# Patient Record
Sex: FEMALE | URBAN - METROPOLITAN AREA
[De-identification: names, ages, dates, MRNs, and addresses within clinical notes are randomized per-mention and may not be internally consistent; named-entity substitution may affect disease eponyms.]

---

## 2020-06-22 ENCOUNTER — RECORDS - HEALTHEAST (OUTPATIENT)
Dept: LAB | Facility: CLINIC | Age: 30
End: 2020-06-22

## 2020-06-22 LAB — RHEUMATOID FACT SERPL-ACNC: <15 IU/ML (ref 0–30)

## 2020-06-23 LAB — ANA SER QL: >12.8 U

## 2020-06-30 LAB
DNA (DS) ANTIBODY - HISTORICAL: 2 IU
JO-1 AUTOANTIBODIES - HISTORICAL: 0 EU
SCL-70 AUTOANTIBODIES - HISTORICAL: 1 EU
SM (SMITH AUTOANTIBODIES - HISTORICAL: 4 EU
SM/RNP AUTOANTIBODIES - HISTORICAL: 241 EU
SS-A/RO AUTOANTIBODIES - HISTORICAL: 1 EU
SS-B/LA AUTOANTIBODIES - HISTORICAL: 0 EU

## 2024-08-19 ENCOUNTER — HOSPITAL ENCOUNTER (INPATIENT)
Facility: HOSPITAL | Age: 34
Setting detail: SURGERY ADMIT
End: 2024-08-19
Attending: OBSTETRICS & GYNECOLOGY | Admitting: OBSTETRICS & GYNECOLOGY
Payer: MEDICAID

## 2024-11-20 RX ORDER — QUETIAPINE FUMARATE 100 MG/1
100 TABLET, FILM COATED ORAL AT BEDTIME
COMMUNITY
Start: 2024-06-20

## 2024-11-20 RX ORDER — DEXAMETHASONE 4 MG/1
4 TABLET ORAL 2 TIMES DAILY
COMMUNITY
Start: 2024-10-10

## 2024-11-20 RX ORDER — ESCITALOPRAM OXALATE 10 MG/1
10 TABLET ORAL DAILY
COMMUNITY

## 2024-11-20 RX ORDER — FERROUS SULFATE 325(65) MG
1 TABLET ORAL 2 TIMES DAILY
COMMUNITY
Start: 2024-06-20

## 2024-11-20 RX ORDER — TRAZODONE HYDROCHLORIDE 100 MG/1
100 TABLET ORAL AT BEDTIME
COMMUNITY
Start: 2024-06-20

## 2024-11-20 RX ORDER — OLANZAPINE 5 MG/1
5 TABLET ORAL DAILY
COMMUNITY
Start: 2024-10-10

## 2024-11-22 ENCOUNTER — HOSPITAL ENCOUNTER (OUTPATIENT)
Facility: HOSPITAL | Age: 34
Discharge: HOME | End: 2024-11-22
Attending: OBSTETRICS & GYNECOLOGY | Admitting: OBSTETRICS & GYNECOLOGY
Payer: MEDICAID

## 2024-11-22 VITALS
TEMPERATURE: 97.6 F | DIASTOLIC BLOOD PRESSURE: 74 MMHG | OXYGEN SATURATION: 98 % | WEIGHT: 137.7 LBS | HEART RATE: 80 BPM | RESPIRATION RATE: 14 BRPM | SYSTOLIC BLOOD PRESSURE: 115 MMHG

## 2024-11-22 LAB — HCG UR QL: NEGATIVE

## 2024-11-22 PROCEDURE — 360N000075 HC SURGERY LEVEL 2, PER MIN: Performed by: OBSTETRICS & GYNECOLOGY

## 2024-11-22 PROCEDURE — 81025 URINE PREGNANCY TEST: CPT | Performed by: PHYSICIAN ASSISTANT

## 2024-11-22 PROCEDURE — 272N000001 HC OR GENERAL SUPPLY STERILE: Performed by: OBSTETRICS & GYNECOLOGY

## 2024-11-22 PROCEDURE — 250N000009 HC RX 250: Performed by: OBSTETRICS & GYNECOLOGY

## 2024-11-22 PROCEDURE — 999N000141 HC STATISTIC PRE-PROCEDURE NURSING ASSESSMENT: Performed by: OBSTETRICS & GYNECOLOGY

## 2024-11-22 PROCEDURE — 710N000012 HC RECOVERY PHASE 2, PER MINUTE: Performed by: OBSTETRICS & GYNECOLOGY

## 2024-11-22 PROCEDURE — 370N000017 HC ANESTHESIA TECHNICAL FEE, PER MIN: Performed by: OBSTETRICS & GYNECOLOGY

## 2024-11-22 DEVICE — SLEEVE CT/MR INTRAUTERINE TUBE <=Ø 6MM
Type: IMPLANTABLE DEVICE | Site: CERVIX | Status: FUNCTIONAL
Brand: SMIT SLEEVE

## 2024-11-22 RX ORDER — ONDANSETRON 2 MG/ML
4 INJECTION INTRAMUSCULAR; INTRAVENOUS EVERY 30 MIN PRN
Status: DISCONTINUED | OUTPATIENT
Start: 2024-11-22 | End: 2024-11-22 | Stop reason: HOSPADM

## 2024-11-22 RX ORDER — OXYCODONE HYDROCHLORIDE 5 MG/1
10 TABLET ORAL
Status: DISCONTINUED | OUTPATIENT
Start: 2024-11-22 | End: 2024-11-22 | Stop reason: HOSPADM

## 2024-11-22 RX ORDER — ONDANSETRON 4 MG/1
4 TABLET, ORALLY DISINTEGRATING ORAL EVERY 30 MIN PRN
Status: DISCONTINUED | OUTPATIENT
Start: 2024-11-22 | End: 2024-11-22 | Stop reason: HOSPADM

## 2024-11-22 RX ORDER — LIDOCAINE 40 MG/G
CREAM TOPICAL
Status: DISCONTINUED | OUTPATIENT
Start: 2024-11-22 | End: 2024-11-22 | Stop reason: HOSPADM

## 2024-11-22 RX ORDER — OXYCODONE HYDROCHLORIDE 5 MG/1
5 TABLET ORAL
Status: DISCONTINUED | OUTPATIENT
Start: 2024-11-22 | End: 2024-11-22 | Stop reason: HOSPADM

## 2024-11-22 RX ORDER — DEXAMETHASONE SODIUM PHOSPHATE 10 MG/ML
4 INJECTION, SOLUTION INTRAMUSCULAR; INTRAVENOUS
Status: DISCONTINUED | OUTPATIENT
Start: 2024-11-22 | End: 2024-11-22 | Stop reason: HOSPADM

## 2024-11-22 RX ORDER — IBUPROFEN 200 MG
800 TABLET ORAL ONCE
Status: DISCONTINUED | OUTPATIENT
Start: 2024-11-23 | End: 2024-11-22 | Stop reason: HOSPADM

## 2024-11-22 RX ORDER — SODIUM CHLORIDE, SODIUM LACTATE, POTASSIUM CHLORIDE, CALCIUM CHLORIDE 600; 310; 30; 20 MG/100ML; MG/100ML; MG/100ML; MG/100ML
INJECTION, SOLUTION INTRAVENOUS CONTINUOUS
Status: DISCONTINUED | OUTPATIENT
Start: 2024-11-22 | End: 2024-11-22 | Stop reason: HOSPADM

## 2024-11-22 RX ORDER — NALOXONE HYDROCHLORIDE 0.4 MG/ML
0.1 INJECTION, SOLUTION INTRAMUSCULAR; INTRAVENOUS; SUBCUTANEOUS
Status: DISCONTINUED | OUTPATIENT
Start: 2024-11-22 | End: 2024-11-22 | Stop reason: HOSPADM

## 2024-11-22 RX ORDER — LIDOCAINE HYDROCHLORIDE 10 MG/ML
INJECTION, SOLUTION INFILTRATION; PERINEURAL PRN
Status: DISCONTINUED | OUTPATIENT
Start: 2024-11-22 | End: 2024-11-22 | Stop reason: HOSPADM

## 2024-11-22 RX ORDER — ACETAMINOPHEN 325 MG/1
975 TABLET ORAL ONCE
Status: DISCONTINUED | OUTPATIENT
Start: 2024-11-23 | End: 2024-11-22 | Stop reason: HOSPADM

## 2024-11-22 ASSESSMENT — ACTIVITIES OF DAILY LIVING (ADL)
ADLS_ACUITY_SCORE: 0
ADLS_ACUITY_SCORE: 0

## 2024-11-22 NOTE — H&P
I have seen and examined the patient. There are no updates or changes to the preoperative history and physical.    Jolene Hicks MD  Gynecologic Oncology  [facility]  [facility]: [phone]

## 2024-11-22 NOTE — PROCEDURES
DATE OF SERVICE:    11/22/2024      SURGEON:    Jolene Hicks MD     PREOPERATIVE DIAGNOSIS:   Squamous cell carcinoma of the cervix  History of Leopoldo sleeve placement  Abnormal Leopoldo sleeve position    POSTOPERATIVE DIAGNOSIS:   Same    PROCEDURE:  Examination under anesthesia, removal of Leopoldo sleeve, placement of new Leopoldo sleeve     ANESTHESIA:    MAC plus local     COMPLICATIONS:  None.     ESTIMATED BLOOD LOSS :   5 mL    IV FLUIDS:    300 ml LR    FINDINGS:  Absent cervical stroma along the anterior and bilateral aspects of the cervix.  The only lip of normal cervix was present at 5-7 o'clock.  Otherwise, the upper endocervical canal was flush with the cervical vaginal fornix.  There was no Leopoldo sleeve in place.  Sutures were placed at 1, 11 and 6:00.  Stitches were placed within the cervical stroma in addition to more proximal mucosa than previously placed.  The normal-appearing lip of the cervix was more distal to the Leopoldo sleeve.     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 Unfortunately, subsequent PET/CT identified retroperitoneal lymphadenopathy within the pelvis, left greater than right. A pelvic MRI identified a 6.3 cm primary cervical tumor with parametrial extension in addition to a 1.2 cm right external iliac lymph node. The patient was recommended primary chemoradiotherapy with the addition of pembrolizumab immune checkpoint inhibitor.  Unfortunately, following initiation of radiation, she presented within the second week to [facility] with profuse vaginal bleeding where she underwent interventional radiology uterine artery embolization. She has subsequently continued on treatment and on November 13, 2024 underwent Leopoldo sleeve placement.  This was uncomplicated with the exception of difficult anatomical landmarks.  Unfortunately, on presentation to the clinic for  brachytherapy, the Leopoldo sleeve had become more mobile with reconstruction likely secondary to radiation changes.  Placement of the tandem within the clinic was no longer feasible.  Carolyn was scheduled for replacement of the Leopoldo sleeve this morning and unfortunately failed to follow-up for her visit to the operating room. She was rescheduled for this evening and thankfully was able to get a . She was seen in the preoperative area where the procedure was reviewed in detail. The risks, benefits and alternatives were discussed in detail. The consent was signed in the preoperative area. The patient was subsequently brought to the operative suite where heavy sedation was found to be adequate. She was prepared and draped in the normal sterile fashion in high lithotomy position. A briefing and timeout were performed and the staff in the room were educated as to the planned procedure. An exam under anesthesia revealed the findings as per above.     The posterior lip of the cervix was grasped with a single tooth tenaculum as this was the only discernible potentially normal cervical tissue remaining.  The uterine sound was utilized to identify the endometrial cavity.  The endocervical canal was dilated with Hegar dilator sequentially.  The 0 Vicryl sutures were placed at 1, 11, 6:00. These were placed closer to the endocervical canal than previously placed. The sutures were held in place with hemostats.  The smallest Hegar dilator was placed within the endometrial cavity.  The Leopoldo sleeve was threaded over the smallest Hegar dilator and subsequently was tied into place utilizing the 0 Vicryl sutures placed. The uterine sound was utilized to verify correct placement.  There was no mobility to the Leopoldo sleeve.  A digital exam confirmed the Leopoldo sleeve was fixed within the space. The sutures were cut and all instruments were removed from the patient's vaginal cavity.    The patient tolerated the procedure very well. All  sponge, lap and needle counts were correct x2 at the completion of the procedure. She was taken to the recovery room in a stable condition.     Jolene Hicks MD  Gynecologic Oncologist  [facility]  [place] [facility]  [phone]

## (undated) DEVICE — SUTURE VICRYL+ 0 27IN CT-1 UND VCP260H

## (undated) DEVICE — DRSG TELFA 3X4" 1050

## (undated) DEVICE — SOL WATER IRRIG 1000ML BOTTLE 2F7114

## (undated) DEVICE — CUSTOM PACK PERI GYN SMA5BPGHEA

## (undated) DEVICE — GLOVE BIOGEL PI ULTRATOUCH G SZ 6.5 42165

## (undated) DEVICE — GLOVE UNDER INDICATOR PI SZ 7.0 LF 41670

## (undated) DEVICE — GOWN IMPERVIOUS BREATHABLE SMART LG 89015

## (undated) DEVICE — MAT FLOOR WATERPROOF BACKSHEET FMBP30

## (undated) DEVICE — GLOVE BIOGEL PI ULTRATOUCH G SZ 7.0 42170

## (undated) DEVICE — SU VICRYL+ 0 27IN CT-2 UND VCP270H

## (undated) DEVICE — PREP DYNA-HEX 4% CHG SCRUB 4OZ BOTTLE MDS098710

## (undated) DEVICE — BRIEF STRETCH XL MPS40

## (undated) RX ORDER — DEXAMETHASONE SODIUM PHOSPHATE 10 MG/ML
INJECTION, SOLUTION INTRAMUSCULAR; INTRAVENOUS
Status: DISPENSED
Start: 2024-11-22

## (undated) RX ORDER — LIDOCAINE HYDROCHLORIDE 10 MG/ML
INJECTION, SOLUTION EPIDURAL; INFILTRATION; INTRACAUDAL; PERINEURAL
Status: DISPENSED
Start: 2024-11-22

## (undated) RX ORDER — ONDANSETRON 2 MG/ML
INJECTION INTRAMUSCULAR; INTRAVENOUS
Status: DISPENSED
Start: 2024-11-22

## (undated) RX ORDER — PROPOFOL 10 MG/ML
INJECTION, EMULSION INTRAVENOUS
Status: DISPENSED
Start: 2024-11-22

## (undated) RX ORDER — FENTANYL CITRATE 50 UG/ML
INJECTION, SOLUTION INTRAMUSCULAR; INTRAVENOUS
Status: DISPENSED
Start: 2024-11-22